# Patient Record
Sex: FEMALE | Race: BLACK OR AFRICAN AMERICAN | NOT HISPANIC OR LATINO | URBAN - METROPOLITAN AREA
[De-identification: names, ages, dates, MRNs, and addresses within clinical notes are randomized per-mention and may not be internally consistent; named-entity substitution may affect disease eponyms.]

---

## 2023-01-12 ENCOUNTER — EMERGENCY (EMERGENCY)
Facility: HOSPITAL | Age: 53
LOS: 1 days | Discharge: HOME | End: 2023-01-12
Attending: STUDENT IN AN ORGANIZED HEALTH CARE EDUCATION/TRAINING PROGRAM | Admitting: STUDENT IN AN ORGANIZED HEALTH CARE EDUCATION/TRAINING PROGRAM
Payer: SELF-PAY

## 2023-01-12 VITALS
OXYGEN SATURATION: 100 % | WEIGHT: 276.02 LBS | SYSTOLIC BLOOD PRESSURE: 173 MMHG | HEART RATE: 87 BPM | DIASTOLIC BLOOD PRESSURE: 69 MMHG | TEMPERATURE: 99 F | RESPIRATION RATE: 18 BRPM

## 2023-01-12 VITALS
OXYGEN SATURATION: 100 % | SYSTOLIC BLOOD PRESSURE: 168 MMHG | DIASTOLIC BLOOD PRESSURE: 67 MMHG | RESPIRATION RATE: 18 BRPM | HEART RATE: 77 BPM | TEMPERATURE: 99 F

## 2023-01-12 PROCEDURE — 73562 X-RAY EXAM OF KNEE 3: CPT | Mod: 26,LT

## 2023-01-12 PROCEDURE — 99284 EMERGENCY DEPT VISIT MOD MDM: CPT | Mod: 25

## 2023-01-12 PROCEDURE — 93971 EXTREMITY STUDY: CPT | Mod: 26,LT

## 2023-01-12 PROCEDURE — 93971 EXTREMITY STUDY: CPT

## 2023-01-12 PROCEDURE — 73562 X-RAY EXAM OF KNEE 3: CPT

## 2023-01-12 PROCEDURE — 99284 EMERGENCY DEPT VISIT MOD MDM: CPT

## 2023-01-12 RX ORDER — ACETAMINOPHEN 500 MG
650 TABLET ORAL ONCE
Refills: 0 | Status: COMPLETED | OUTPATIENT
Start: 2023-01-12 | End: 2023-01-12

## 2023-01-12 RX ADMIN — Medication 650 MILLIGRAM(S): at 18:38

## 2023-01-12 NOTE — ED PROVIDER NOTE - CLINICAL SUMMARY MEDICAL DECISION MAKING FREE TEXT BOX
History consistent with L knee sprain, will r/o fracture or dislocation of knee.  Unilateral leg swelling concerning for possible DVT, will obtain US for further diagnostic clarity.  No chest pain or shortness of breath concerning for PE.  Will treat pain.  No symptoms concerning for arterial or neurological dysfunction of left leg.

## 2023-01-12 NOTE — ED PROVIDER NOTE - NSFOLLOWUPINSTRUCTIONS_ED_ALL_ED_FT
Coughlin Cyst       A Baker cyst, also called a popliteal cyst, is a growth that forms at the back of the knee. The cyst forms when the fluid-filled sac (bursa) that cushions the knee joint becomes enlarged.      What are the causes?    In most cases, a Baker cyst results from another knee problem that causes swelling inside the knee. This makes the fluid inside the knee joint (synovial fluid) flow into the bursa behind the knee, causing the bursa to enlarge.      What increases the risk?    You may be more likely to develop a Baker cyst if you already have a knee problem, such as:  •A tear in cartilage that cushions the knee joint (meniscal tear).      •A tear in the tissues that connect the bones of the knee joint (ligament tear).      •Knee swelling from osteoarthritis, rheumatoid arthritis, or gout.        What are the signs or symptoms?    The main symptom of this condition is a lump behind the knee. This may be the only symptom of the condition. The lump may be painful, especially when the knee is straightened. If the lump is painful, the pain may come and go. The knee may also be stiff.    Symptoms may quickly get more severe if the cyst breaks open (ruptures). If the cyst ruptures, you may feel the following in your knee and calf:  •Sudden or worsening pain.      •Swelling.      •Bruising.      •Redness in the calf.      A Baker cyst does not always cause symptoms.      How is this diagnosed?    This condition may be diagnosed based on your symptoms and medical history. Your health care provider will also do a physical exam. This may include:  •Feeling the cyst to check whether it is tender.      •Checking your knee for signs of another knee condition that causes swelling.      You may have imaging tests, such as:  •X-rays.      •MRI.      •Ultrasound.        How is this treated?    A Baker cyst that is not painful may go away without treatment. If the cyst gets large or painful, it will likely get better if the underlying knee problem is treated.    If needed, treatment for a Baker cyst may include:  •Resting.      •Keeping weight off of the knee. This means not leaning on the knee to support your body weight.      •Taking NSAIDs, such as ibuprofen, to reduce pain and swelling.      •Having a procedure to drain the fluid from the cyst with a needle (aspiration). You may also get an injection of a medicine that reduces swelling (steroid).      •Having surgery. This may be needed if other treatments do not work. This usually involves correcting knee damage and removing the cyst.        Follow these instructions at home:     Activity     •Rest as told by your health care provider.      •Avoid activities that make pain or swelling worse.      •Return to your normal activities as told by your health care provider. Ask your health care provider what activities are safe for you.      • Do not use the injured limb to support your body weight until your health care provider says that you can. Use crutches as told by your health care provider.      General instructions     •Take over-the-counter and prescription medicines only as told by your health care provider.      •Keep all follow-up visits as told by your health care provider. This is important.        Contact a health care provider if:    •You have knee pain, stiffness, or swelling that does not get better.        Get help right away if:    •You have sudden or worsening pain and swelling in your calf area.        Summary    •A Baker cyst, also called a popliteal cyst, is a growth that forms at the back of the knee.      •In most cases, a Baker cyst results from another knee problem that causes swelling inside the knee.      •A Baker cyst that is not painful may go away without treatment.      •If needed, treatment for a Baker cyst may include resting, keeping weight off of the knee, medicines, or draining fluid from the cyst.      •Surgery may be needed if other treatments are not effective.      This information is not intended to replace advice given to you by your health care provider. Make sure you discuss any questions you have with your health care provider.

## 2023-01-12 NOTE — ED PROVIDER NOTE - NSFOLLOWUPCLINICS_GEN_ALL_ED_FT
[facility]  Family Medicine  [address]  [place], [place] [address]  Phone: [phone]  Fax:

## 2023-01-12 NOTE — ED PROVIDER NOTE - PHYSICAL EXAMINATION
General: comfortable, resting in ED  HEENT: atraumatic, no eye erythema or discharge  Pulm: no cyanosis, no added work of breathing  Cardiac: extremities warm, intact peripheral pulse  GI: no abdominal distension, abdomen nontender  Neuro: alert, conversant  Psych: neutral affect, cooperative  Msk: no gross deformity or instability of left knee to varus/valgus/anterior draw/posterior draw, full range of motion L knee, pitting edema of L leg  Skin: no erythema or rash over L leg

## 2023-01-12 NOTE — ED PROVIDER NOTE - PATIENT PORTAL LINK FT
You can access the FollowMyHealth Patient Portal offered by [facility] by registering at the following website: [email or web address]. By joining [facility]’s FollowMyHealth portal, you will also be able to view your health information using other applications (apps) compatible with our system.

## 2023-01-12 NOTE — ED PROVIDER NOTE - OBJECTIVE STATEMENT
52F with HTN, now with sudden onset popping sensation behind left knee today, still able to walk but with difficulty getting up and sitting down.  Has also been having gradual onset left sided leg swelling for an unclear duration, swelling is only on left.

## 2023-01-12 NOTE — ED PROVIDER NOTE - CARE PLAN
Principal Discharge DX:	Left knee pain   1 Principal Discharge DX:	Left knee pain  Secondary Diagnosis:	Bakers cyst

## 2023-01-12 NOTE — ED PROVIDER NOTE - PROGRESS NOTE DETAILS
XR and US remarkable only for Baker's cyst.  Plan to discharge home with return precautions and outpatient followup.

## 2023-01-14 DIAGNOSIS — R60.0 LOCALIZED EDEMA: ICD-10-CM

## 2023-01-14 DIAGNOSIS — M71.22 SYNOVIAL CYST OF POPLITEAL SPACE [BAKER], LEFT KNEE: ICD-10-CM

## 2023-01-14 DIAGNOSIS — I10 ESSENTIAL (PRIMARY) HYPERTENSION: ICD-10-CM

## 2023-01-14 DIAGNOSIS — M25.562 PAIN IN LEFT KNEE: ICD-10-CM

## 2024-05-09 PROBLEM — I10 ESSENTIAL (PRIMARY) HYPERTENSION: Chronic | Status: ACTIVE | Noted: 2023-01-12

## 2024-06-05 PROBLEM — Z00.00 ENCOUNTER FOR PREVENTIVE HEALTH EXAMINATION: Status: ACTIVE | Noted: 2024-06-05

## 2024-06-07 ENCOUNTER — NON-APPOINTMENT (OUTPATIENT)
Age: 54
End: 2024-06-07

## 2024-06-07 ENCOUNTER — APPOINTMENT (OUTPATIENT)
Dept: HEART AND VASCULAR | Facility: CLINIC | Age: 54
End: 2024-06-07
Payer: COMMERCIAL

## 2024-06-07 VITALS
DIASTOLIC BLOOD PRESSURE: 84 MMHG | BODY MASS INDEX: 36.41 KG/M2 | RESPIRATION RATE: 16 BRPM | OXYGEN SATURATION: 97 % | HEART RATE: 84 BPM | WEIGHT: 260.06 LBS | TEMPERATURE: 97.9 F | HEIGHT: 71 IN | SYSTOLIC BLOOD PRESSURE: 132 MMHG

## 2024-06-07 VITALS — SYSTOLIC BLOOD PRESSURE: 124 MMHG | DIASTOLIC BLOOD PRESSURE: 80 MMHG

## 2024-06-07 DIAGNOSIS — I49.3 VENTRICULAR PREMATURE DEPOLARIZATION: ICD-10-CM

## 2024-06-07 DIAGNOSIS — I34.0 NONRHEUMATIC MITRAL (VALVE) INSUFFICIENCY: ICD-10-CM

## 2024-06-07 DIAGNOSIS — E78.6 LIPOPROTEIN DEFICIENCY: ICD-10-CM

## 2024-06-07 DIAGNOSIS — Z82.49 FAMILY HISTORY OF ISCHEMIC HEART DISEASE AND OTHER DISEASES OF THE CIRCULATORY SYSTEM: ICD-10-CM

## 2024-06-07 DIAGNOSIS — Z83.3 FAMILY HISTORY OF DIABETES MELLITUS: ICD-10-CM

## 2024-06-07 DIAGNOSIS — R73.09 OTHER ABNORMAL GLUCOSE: ICD-10-CM

## 2024-06-07 DIAGNOSIS — I10 ESSENTIAL (PRIMARY) HYPERTENSION: ICD-10-CM

## 2024-06-07 DIAGNOSIS — Z80.0 FAMILY HISTORY OF MALIGNANT NEOPLASM OF DIGESTIVE ORGANS: ICD-10-CM

## 2024-06-07 DIAGNOSIS — Z78.9 OTHER SPECIFIED HEALTH STATUS: ICD-10-CM

## 2024-06-07 PROCEDURE — 93000 ELECTROCARDIOGRAM COMPLETE: CPT

## 2024-06-07 PROCEDURE — G2211 COMPLEX E/M VISIT ADD ON: CPT | Mod: NC,1L

## 2024-06-07 PROCEDURE — 99203 OFFICE O/P NEW LOW 30 MIN: CPT | Mod: 25

## 2024-06-07 RX ORDER — AMLODIPINE BESYLATE 5 MG/1
TABLET ORAL
Refills: 0 | Status: ACTIVE | COMMUNITY

## 2024-06-07 NOTE — ASSESSMENT
[FreeTextEntry1] : An EKG was performed to evaluate for arrhythmia and ischemia.  VPC-- seen 2% on monitor. We discussed possible use of Metoprolol  .nonrheumatic MR-- We discussed possible use of Metoprolol  Hypertension--We discussed a goal of /80 or lower in the office. BP   at  goal. She is unsure of Amlodipine dose. Would likely stop if switched to Metoprolol  Follow-up for stress-echo  I encouraged continued risk factor reduction and gradual increase in aerobic activity as tolerated   32  minutes were spent discussing cardiac risk excluding procedure time

## 2024-06-07 NOTE — HISTORY OF PRESENT ILLNESS
[FreeTextEntry1] : 54 year female who comes for Cardiology check up. She denies having any chest pain, SOB, DEVI, dizziness, palpitations, orthopnea, PND or syncope. She notes having LLE edema following a trauma years ago. We reviewed the results of her recent Echo, labs and event recorder in detail. MR and VPCs were seen. He HgbA1c was elevated and HDL was low

## 2024-06-07 NOTE — REVIEW OF SYSTEMS
[Lower Ext Edema] : lower extremity edema [Negative] : Constitutional [Blurry Vision] : no blurred vision [Seeing Double (Diplopia)] : no diplopia [Hearing Loss] : no hearing loss [Tinnitus] : no tinnitus [SOB] : no shortness of breath [Dyspnea on exertion] : not dyspnea during exertion [Chest Discomfort] : no chest discomfort [Leg Claudication] : no intermittent leg claudication [Palpitations] : no palpitations [Orthopnea] : no orthopnea [PND] : no PND [Syncope] : no syncope

## 2024-07-12 ENCOUNTER — APPOINTMENT (OUTPATIENT)
Dept: HEART AND VASCULAR | Facility: CLINIC | Age: 54
End: 2024-07-12
Payer: COMMERCIAL

## 2024-07-12 DIAGNOSIS — I10 ESSENTIAL (PRIMARY) HYPERTENSION: ICD-10-CM

## 2024-07-12 DIAGNOSIS — I49.3 VENTRICULAR PREMATURE DEPOLARIZATION: ICD-10-CM

## 2024-07-12 PROCEDURE — G2211 COMPLEX E/M VISIT ADD ON: CPT | Mod: NC,1L

## 2024-07-12 PROCEDURE — 99213 OFFICE O/P EST LOW 20 MIN: CPT | Mod: 25

## 2024-07-12 PROCEDURE — 93351 STRESS TTE COMPLETE: CPT
